# Patient Record
Sex: MALE | Race: WHITE | URBAN - METROPOLITAN AREA
[De-identification: names, ages, dates, MRNs, and addresses within clinical notes are randomized per-mention and may not be internally consistent; named-entity substitution may affect disease eponyms.]

---

## 2023-11-07 ENCOUNTER — TELEPHONE (OUTPATIENT)
Age: 28
End: 2023-11-07

## 2024-01-22 ENCOUNTER — PROCEDURE VISIT (OUTPATIENT)
Age: 29
End: 2024-01-22

## 2024-01-22 DIAGNOSIS — G47.33 OBSTRUCTIVE SLEEP APNEA (ADULT) (PEDIATRIC): Primary | ICD-10-CM

## 2024-01-22 NOTE — PROGRESS NOTES
S>Wei Haas III is a 28 y.o. male seen today to receive a home sleep testing unit (WatchPAT).    Patient was educated on proper hookup and operation of the WatchPAT HST via detailed instruction sheet .  Instruction forms with after hours contact and documentation were signed.    O>    There were no vitals taken for this visit.      A>  1. Obstructive sleep apnea (adult) (pediatric)          P>  General information regarding operations and maintenance of the device was provided.  Follow-up appointment was made to return the WatchPAT HST. He will be contacted once the results have been reviewed.  He was asked to contact our office for any problems regarding his home sleep test study.

## 2024-01-24 ENCOUNTER — TELEPHONE (OUTPATIENT)
Age: 29
End: 2024-01-24

## 2024-01-30 NOTE — TELEPHONE ENCOUNTER
HSAT in r-drive.    Tech to convey results to patient          Your test showed mild sleep apnea.    Apnea/hypopnea index was 7/hour. Mild(lowest 86%) oxygen desaturations. Significant snoring noted.     Along with weight loss, treatment options include      CPAP would take care of all apneas and snoring regardless of position of sleep. If he  is agreeable to use this at night when sleeping , I will order it  and see him after he  has used it a month or so. CPAP is the gold standard for treatment of sleep apnea.   Mouthpiece/dental device-can reduce apneas but they do not work well if sleeping on back so the positioning device is needed with this option in addition to the mouthpiece. Generally, snoring will be reduced but not eliminated.   Avoiding all sleeping on back. I would recommend he  do this with assistance of a positioning device which looks like a big fannypack that is worn to bed to keep off back position (Slumber bump or sleep noodle). These can be purchased online or can be made by taking a fannypack and stuffing it with 2-3 tennis balls.

## 2024-01-30 NOTE — TELEPHONE ENCOUNTER
Reviewed sleep study results with patient. He expressed understanding and is willing to proceed with a trial of APAP.  Front staff to send results and script via mail and email to patient. Thank you.

## 2024-01-31 ENCOUNTER — TELEPHONE (OUTPATIENT)
Age: 29
End: 2024-01-31

## 2024-01-31 DIAGNOSIS — G47.33 OBSTRUCTIVE SLEEP APNEA (ADULT) (PEDIATRIC): Primary | ICD-10-CM

## 2024-02-02 ENCOUNTER — CLINICAL DOCUMENTATION (OUTPATIENT)
Age: 29
End: 2024-02-02